# Patient Record
Sex: FEMALE | Race: WHITE | Employment: OTHER | URBAN - METROPOLITAN AREA
[De-identification: names, ages, dates, MRNs, and addresses within clinical notes are randomized per-mention and may not be internally consistent; named-entity substitution may affect disease eponyms.]

---

## 2021-09-09 ENCOUNTER — HOSPITAL ENCOUNTER (EMERGENCY)
Age: 76
Discharge: HOME | End: 2021-09-10
Attending: EMERGENCY MEDICINE
Payer: MEDICARE

## 2021-09-09 DIAGNOSIS — R04.0 ACUTE POSTERIOR EPISTAXIS: Primary | ICD-10-CM

## 2021-09-09 PROCEDURE — 99283 EMERGENCY DEPT VISIT LOW MDM: CPT

## 2021-09-10 VITALS
HEIGHT: 64 IN | DIASTOLIC BLOOD PRESSURE: 77 MMHG | HEART RATE: 133 BPM | OXYGEN SATURATION: 98 % | RESPIRATION RATE: 15 BRPM | TEMPERATURE: 98.7 F | BODY MASS INDEX: 23.07 KG/M2 | SYSTOLIC BLOOD PRESSURE: 143 MMHG | WEIGHT: 135.14 LBS

## 2021-09-10 PROCEDURE — 74011250637 HC RX REV CODE- 250/637: Performed by: EMERGENCY MEDICINE

## 2021-09-10 PROCEDURE — 30901 CONTROL OF NOSEBLEED: CPT

## 2021-09-10 RX ORDER — OXYMETAZOLINE HCL 0.05 %
2 SPRAY, NON-AEROSOL (ML) NASAL 2 TIMES DAILY
Qty: 1 EACH | Refills: 0 | Status: SHIPPED | OUTPATIENT
Start: 2021-09-10 | End: 2021-09-13

## 2021-09-10 RX ORDER — OXYMETAZOLINE HCL 0.05 %
2 SPRAY, NON-AEROSOL (ML) NASAL
Status: DISCONTINUED | OUTPATIENT
Start: 2021-09-10 | End: 2021-09-10 | Stop reason: HOSPADM

## 2021-09-10 RX ORDER — AMOXICILLIN 875 MG/1
875 TABLET, FILM COATED ORAL 2 TIMES DAILY
Qty: 10 TABLET | Refills: 0 | Status: SHIPPED | OUTPATIENT
Start: 2021-09-10 | End: 2021-09-15

## 2021-09-10 RX ADMIN — Medication 2 SPRAY: at 00:11

## 2021-09-10 NOTE — ED PROVIDER NOTES
64-year-old female who presents to the ED with family at the bedside with a complaint of nosebleed in the right nostril that began approximately 2 hours prior to arrival to the ED and has been continuous ever since. The patient state that she began to blow her nose hard and began bleeding soon thereafter. She is only taking aspirin as needed. She denies any headache, neck or back pain, chest pain, shortness of breath, nausea, vomiting, abdominal pain, dizziness, extremity weakness or numbness. No past medical history on file. No past surgical history on file. No family history on file. Social History     Socioeconomic History    Marital status: SINGLE     Spouse name: Not on file    Number of children: Not on file    Years of education: Not on file    Highest education level: Not on file   Occupational History    Not on file   Tobacco Use    Smoking status: Not on file   Substance and Sexual Activity    Alcohol use: Not on file    Drug use: Not on file    Sexual activity: Not on file   Other Topics Concern    Not on file   Social History Narrative    Not on file     Social Determinants of Health     Financial Resource Strain:     Difficulty of Paying Living Expenses:    Food Insecurity:     Worried About Running Out of Food in the Last Year:     [address] in the Last Year:    Transportation Needs:     Lack of Transportation (Medical):      Lack of Transportation (Non-Medical):    Physical Activity:     Days of Exercise per Week:     Minutes of Exercise per Session:    Stress:     Feeling of Stress :    Social Connections:     Frequency of Communication with Friends and Family:     Frequency of Social Gatherings with Friends and Family:     Attends [employer] Services:     Active Member of Clubs or Organizations:     Attends Club or Organization Meetings:     Marital Status:    Intimate Partner Violence:     Fear of Current or Ex-Partner:     Emotionally Abused:  Physically Abused:     Sexually Abused: ALLERGIES: Patient has no known allergies. Review of Systems   All other systems reviewed and are negative. Vitals:    09/10/21 0003   BP: (!) 143/77   Pulse: (!) 133   Resp: 15   Temp: 98.7 °F (37.1 °C)   SpO2: 98%   Weight: 61.3 kg (135 lb 2.3 oz)   Height: 5' 4\" (1.626 m)            Physical Exam  Vitals and nursing note reviewed. Exam conducted with a chaperone present. CONSTITUTIONAL: Well-appearing; well-nourished; in no apparent distress  HEAD: Normocephalic; atraumatic  EYES: PERRL; EOM intact; conjunctiva and sclera are clear bilaterally. ENT: Moderate bleeding to right nostril of uncertain origin; no bleeding from the left nostril; no rhinorrhea; normal pharynx with no tonsillar hypertrophy; mucous membranes pink/moist, no erythema, no exudate. NECK: Supple; non-tender; no cervical lymphadenopathy  CARD: Normal S1, S2; no murmurs, rubs, or gallops. Regular rate and rhythm. RESP: Normal respiratory effort; breath sounds clear and equal bilaterally; no wheezes, rhonchi, or rales. ABD: Normal bowel sounds; non-distended; non-tender; no palpable organomegaly, no masses, no bruits. Back Exam: Normal inspection; no vertebral point tenderness, no CVA tenderness. Normal range of motion. EXT: Normal ROM in all four extremities; non-tender to palpation; no swelling or deformity; distal pulses are normal, no edema. SKIN: Warm; dry; no rash. NEURO:Alert and oriented x 3, coherent, TERRIE-XII grossly intact, sensory and motor are non-focal.        MDM  Number of Diagnoses or Management Options  Diagnosis management comments: Assessment: Acute nosebleed of right nostril of the ulcer in origin at this time.   The patient does state that she has bleeding going through the back of her throat with clots-suspect posterior aspect of nosebleed    Plan: Education, reassurance, symptomatic treatment/epistaxis management with Afrin and Rhino Rocket/serial exam/ Monitor and Reevaluate. \       Amount and/or Complexity of Data Reviewed  Clinical lab tests: ordered and reviewed  Tests in the radiology section of CPT®: ordered and reviewed  Tests in the medicine section of CPT®: ordered  Discussion of test results with the performing providers: yes  Decide to obtain previous medical records or to obtain history from someone other than the patient: yes  Obtain history from someone other than the patient: yes  Review and summarize past medical records: yes  Discuss the patient with other providers: yes    Risk of Complications, Morbidity, and/or Mortality  Presenting problems: moderate  Diagnostic procedures: moderate  Management options: moderate    Patient Progress  Patient progress: stable         Epistaxis Management    Date/Time: 9/10/2021 12:18 AM  Performed by: Madelin Chacon MD  Authorized by: Madelin Chacon MD     Consent:     Consent obtained:  Verbal    Consent given by:  Patient    Risks discussed:  Bleeding    Alternatives discussed:  No treatment  Anesthesia (see MAR for exact dosages): Anesthesia method:  None  Procedure details:     Treatment site:  Unable to specify    Repair method: 5.5 cm Rhino Rocket. Treatment complexity:  Extensive    Treatment episode: initial    Post-procedure details:     Assessment:  Bleeding stopped    Patient tolerance of procedure: Tolerated well, no immediate complications      Progress Note:   Pt has been reexamined by Fabiana Parra MD. Pt is feeling much better. Symptoms have improved. All available results have been reviewed with pt and any available family. Pt understands sx, dx, and tx in ED. Care plan has been outlined and questions have been answered. Pt is ready to go home. Will send home on acute epistaxis instruction. Prescription of Afrin and Amoxil. Outpatient referral with PCP/ENT for reevaluation and further treatment in 3 to 5 days as needed. Written by Fabiana Parra MD,12:55 AM    .   .

## 2022-08-12 ENCOUNTER — OFFICE VISIT (OUTPATIENT)
Dept: ORTHOPEDIC SURGERY | Age: 77
End: 2022-08-12
Payer: MEDICARE

## 2022-08-12 VITALS — HEIGHT: 64 IN | WEIGHT: 130 LBS | BODY MASS INDEX: 22.2 KG/M2

## 2022-08-12 DIAGNOSIS — M47.816 LUMBAR SPONDYLOSIS: ICD-10-CM

## 2022-08-12 DIAGNOSIS — M16.11 PRIMARY OSTEOARTHRITIS OF RIGHT HIP: Primary | ICD-10-CM

## 2022-08-12 PROCEDURE — 1101F PT FALLS ASSESS-DOCD LE1/YR: CPT | Performed by: ORTHOPAEDIC SURGERY

## 2022-08-12 PROCEDURE — G8420 CALC BMI NORM PARAMETERS: HCPCS | Performed by: ORTHOPAEDIC SURGERY

## 2022-08-12 PROCEDURE — G8536 NO DOC ELDER MAL SCRN: HCPCS | Performed by: ORTHOPAEDIC SURGERY

## 2022-08-12 PROCEDURE — 1090F PRES/ABSN URINE INCON ASSESS: CPT | Performed by: ORTHOPAEDIC SURGERY

## 2022-08-12 PROCEDURE — 99203 OFFICE O/P NEW LOW 30 MIN: CPT | Performed by: ORTHOPAEDIC SURGERY

## 2022-08-12 PROCEDURE — G8427 DOCREV CUR MEDS BY ELIG CLIN: HCPCS | Performed by: ORTHOPAEDIC SURGERY

## 2022-08-12 PROCEDURE — G8400 PT W/DXA NO RESULTS DOC: HCPCS | Performed by: ORTHOPAEDIC SURGERY

## 2022-08-12 PROCEDURE — 1123F ACP DISCUSS/DSCN MKR DOCD: CPT | Performed by: ORTHOPAEDIC SURGERY

## 2022-08-12 PROCEDURE — G8432 DEP SCR NOT DOC, RNG: HCPCS | Performed by: ORTHOPAEDIC SURGERY

## 2022-08-12 RX ORDER — AMLODIPINE BESYLATE 5 MG/1
5 TABLET ORAL DAILY
COMMUNITY
Start: 2022-06-11

## 2022-08-12 RX ORDER — METOPROLOL TARTRATE 50 MG/1
50 TABLET ORAL DAILY
COMMUNITY
Start: 2022-06-11

## 2022-08-12 RX ORDER — POTASSIUM CHLORIDE 750 MG/1
10 TABLET, FILM COATED, EXTENDED RELEASE ORAL DAILY
COMMUNITY
Start: 2022-08-01

## 2022-08-12 RX ORDER — ONDANSETRON HYDROCHLORIDE 8 MG/1
1 TABLET, FILM COATED ORAL 2 TIMES DAILY
COMMUNITY
Start: 2022-07-20

## 2022-08-12 RX ORDER — PROCHLORPERAZINE MALEATE 10 MG
10 TABLET ORAL
COMMUNITY
Start: 2022-07-20

## 2022-08-12 RX ORDER — DEXAMETHASONE 4 MG/1
4 TABLET ORAL 2 TIMES DAILY
COMMUNITY
Start: 2022-07-21

## 2022-08-12 RX ORDER — TRIAMTERENE/HYDROCHLOROTHIAZID 37.5-25 MG
1 TABLET ORAL DAILY
COMMUNITY
Start: 2022-06-11

## 2022-08-12 RX ORDER — IBUPROFEN 600 MG/1
600 TABLET ORAL AS NEEDED
COMMUNITY
Start: 2022-07-19

## 2022-08-12 RX ORDER — PRAVASTATIN SODIUM 40 MG/1
40 TABLET ORAL DAILY
COMMUNITY
Start: 2022-05-27

## 2022-08-12 NOTE — PROGRESS NOTES
Farrukh Witt (: 1945) is a 64 y.o. female, patient, here for evaluation of the following chief complaint(s):  Hip Pain (Right )       SUBJECTIVE/OBJECTIVE:  Farrukh Witt presents today complaining of right buttock pain. Symptoms have been present for about 3 months. Started without trauma or event. Symptoms wax and wane. Typically present with weightbearing activities. Has some gelling symptoms and start up pain. Some days she is able to move much better than others. In general she is needed a cane since her symptoms started. While she states the symptoms are mild, her daughter is with her today says she has been much more sedentary due to the symptoms. Has no awakening night pain. No pain at rest.  No medications for her symptoms. PHYSICAL EXAM:  Vitals: Ht 5' 4\" (1.626 m)   Wt 130 lb (59 kg)   BMI 22.31 kg/m²   Body mass index is 22.31 kg/m². 64y.o. year old F, no distress. Ambulates with slow deliberate gait, but no limp or Trendelenburg pattern. No Trendelenburg sign. Has no pain flexion-extension of lumbar spine. No nerve tension signs. Negative Stinchfield right hip. No trochanteric tenderness on the right side. Right hip range of motion is limited, with gluteal pain on hip flexion and internal rotation. Symmetrical palpable distal pulses. No gross motor or sensory deficits in lower extremities. No distal edema. IMAGING:  Radiographs: XR Results (most recent):  Results from Appointment encounter on 22    XR HIP RT W OR WO PELV 2-3 VWS    Narrative  3 x-ray views of right hip including AP pelvis, AP and frog-lateral images demonstrate severe osteoarthritis of the right hip with complete loss of peripheral joint space. Hypertrophic circumferential osteophyte formation around the acetabular margin. Osteopenia. Malalignment of the pubic symphysis with loss of symphyseal space. Moderate lumbar facet arthropathy present. ASSESSMENT/PLAN:  1.  Primary osteoarthritis of right hip  -     XR HIP RT W OR WO PELV 2-3 VWS; Future  -     REFERRAL TO PHYSICAL THERAPY  2. Lumbar spondylosis  -     REFERRAL TO PHYSICAL THERAPY    The xray and exam findings were discussed with the patient today. She is probably having combined symptoms from lumbar spondylosis and right hip osteoarthritis. I cannot really determine which is the primary source. Discussed conservative treatment measures for now. We will start a course of physical therapy. If no improvement, may consider intra-articular corticosteroid injection of the right hip as a diagnostic and hopefully therapeutic measure. She will return as needed. No follow-ups on file. Review Of Systems  ROS    Positive for: Musculoskeletal  Last edited by Safia You on 8/12/2022  9:10 AM.         Patient denies any recent fever, chills, nausea, vomiting, chest pain, or shortness of breath. No Known Allergies    Current Outpatient Medications   Medication Sig    triamterene-hydroCHLOROthiazide (MAXZIDE) 37.5-25 mg per tablet Take 1 Tablet by mouth in the morning. amLODIPine (NORVASC) 5 mg tablet Take 5 mg by mouth in the morning. metoprolol tartrate (LOPRESSOR) 50 mg tablet Take 50 mg by mouth in the morning. potassium chloride SR (KLOR-CON 10) 10 mEq tablet Take 10 mEq by mouth in the morning. pravastatin (PRAVACHOL) 40 mg tablet Take 40 mg by mouth in the morning. ibuprofen (MOTRIN) 600 mg tablet Take 600 mg by mouth as needed. dexAMETHasone (DECADRON) 4 mg tablet Take 4 mg by mouth two (2) times a day. ondansetron hcl (ZOFRAN) 8 mg tablet Take 1 Tablet by mouth two (2) times a day. prochlorperazine (COMPAZINE) 10 mg tablet Take 10 mg by mouth every six (6) hours as needed. No current facility-administered medications for this visit.        Past Medical History:   Diagnosis Date    Hypertension         Past Surgical History:   Procedure Laterality Date    HX TUMOR REMOVAL  06/03/2022    from abdomen       History reviewed. No pertinent family history. Social History     Socioeconomic History    Marital status: SINGLE     Spouse name: Not on file    Number of children: Not on file    Years of education: Not on file    Highest education level: Not on file   Occupational History    Not on file   Tobacco Use    Smoking status: Never    Smokeless tobacco: Never   Vaping Use    Vaping Use: Never used   Substance and Sexual Activity    Alcohol use: Not Currently    Drug use: Never    Sexual activity: Not Currently   Other Topics Concern    Not on file   Social History Narrative    Not on file     Social Determinants of Health     Financial Resource Strain: Not on file   Food Insecurity: Not on file   Transportation Needs: Not on file   Physical Activity: Not on file   Stress: Not on file   Social Connections: Not on file   Intimate Partner Violence: Not on file   Housing Stability: Not on file       Orders Placed This Encounter    XR HIP RT W OR WO PELV 2-3 VWS     Standing Status:   Future     Number of Occurrences:   1     Standing Expiration Date:   8/13/2023    REFERRAL TO PHYSICAL THERAPY     Referral Priority:   Routine     Referral Type:   PT/OT/ST     Referral Reason:   Specialty Services Required     Number of Visits Requested:   1        An electronic signature was used to authenticate this note.   -- Lobo Finley MD

## 2023-04-28 ENCOUNTER — OFFICE VISIT (OUTPATIENT)
Dept: ORTHOPEDIC SURGERY | Age: 78
End: 2023-04-28

## 2023-04-28 VITALS — HEIGHT: 64 IN | WEIGHT: 130 LBS | BODY MASS INDEX: 22.2 KG/M2

## 2023-04-28 DIAGNOSIS — M84.364A STRESS FRACTURE OF LEFT FIBULA, INITIAL ENCOUNTER: Primary | ICD-10-CM

## 2023-04-28 DIAGNOSIS — M25.572 ACUTE LEFT ANKLE PAIN: ICD-10-CM

## 2023-04-28 NOTE — PROGRESS NOTES
Rcihy Wilkins (: 1945) is a 76 y.o. female, patient,here for evaluation of the following   Chief Complaint   Patient presents with    Ankle Pain     left        ASSESSMENT/PLAN:  Below is the assessment and plan developed based on review of pertinent history, physical exam, labs, studies, and medications. 1. Stress fracture of left fibula, initial encounter  2. Acute left ankle pain  -     XR STANDING ANKLE LT MIN 3 V; Future  -     REFERRAL TO DME  -     [place] [facility] ANKLE GAUNT[place] PRE OTS    Patient verbalized understanding of exam findings and treatment plan. We engaged in the shared decision-making process and treatment options were discussed at length with the patient. Surgical and conservative management discussed today along with risk and benefits. Patient informed of findings on exam and x-rays reviewed. Based on exam and the x-rays correlate, she appears she has a stress fracture through the fibula, she is informed of this finding, and discussed the treatment. We discussed both conservative and operative, surgery is not indicated so we will either go with a boot or a brace. For this patient, he should do much better with a brace and so I recommended a brace today. She is offered and fitted with an ankle stabilizing orthosis. She is weightbearing as tolerated. Next time she returns we will get new x-rays of the left ankle 3 views nonweightbearing, to compare to the x-rays today to see the fracture line is more visible    I discussed management options with the patient. All questions were answered to the best of my ability and to the patient's satisfaction. I discussed their history, symptoms, physical exam findings, diagnostic testing results, diagnosis and treatment options. The patient verbalized understanding and elected to proceed with conservative treatment as above. Return in about 4 weeks (around 2023) for repeat xrays.       No Known Allergies    Current Outpatient Medications   Medication Sig    triamterene-hydroCHLOROthiazide (MAXZIDE) 37.5-25 mg per tablet Take 1 Tablet by mouth in the morning. amLODIPine (NORVASC) 5 mg tablet Take 5 mg by mouth in the morning. metoprolol tartrate (LOPRESSOR) 50 mg tablet Take 50 mg by mouth in the morning. potassium chloride SR (KLOR-CON 10) 10 mEq tablet Take 10 mEq by mouth in the morning. pravastatin (PRAVACHOL) 40 mg tablet Take 40 mg by mouth in the morning. ibuprofen (MOTRIN) 600 mg tablet Take 600 mg by mouth as needed. dexAMETHasone (DECADRON) 4 mg tablet Take 4 mg by mouth two (2) times a day. ondansetron hcl (ZOFRAN) 8 mg tablet Take 1 Tablet by mouth two (2) times a day. prochlorperazine (COMPAZINE) 10 mg tablet Take 10 mg by mouth every six (6) hours as needed. No current facility-administered medications for this visit. Past Medical History:   Diagnosis Date    Hypertension        Past Surgical History:   Procedure Laterality Date    HX TUMOR REMOVAL  06/03/2022    from abdomen       No family history on file.     Social History     Socioeconomic History    Marital status: SINGLE     Spouse name: Not on file    Number of children: Not on file    Years of education: Not on file    Highest education level: Not on file   Occupational History    Not on file   Tobacco Use    Smoking status: Never    Smokeless tobacco: Never   Vaping Use    Vaping Use: Never used   Substance and Sexual Activity    Alcohol use: Not Currently    Drug use: Never    Sexual activity: Not Currently   Other Topics Concern    Not on file   Social History Narrative    Not on file     Social Determinants of Health     Financial Resource Strain: Not on file   Food Insecurity: Not on file   Transportation Needs: Not on file   Physical Activity: Not on file   Stress: Not on file   Social Connections: Not on file   Intimate Partner Violence: Not on file   Housing Stability: Not on file           Vitals:  Ht 5' 4\" (1.626 m)   Saige 130 lb (59 kg)   BMI 22.31 kg/m²    Body mass index is 22.31 kg/m². SUBJECTIVE:  Pamela Camacho (: 1945)   The patient presents today with complaint of pain along the lateral aspect of ankle since 2 weeks ago starting around 2023. Current pain is moderate throbbing pain that comes and goes worse with walking or standing. Symptoms are better with rest but not fully improved. She has not seen other physicians for this problem. Not diabetic. Has heart disease and hypertension. Non-smoker. OBJECTIVE EXAM:     Visit Vitals  Ht 5' 4\" (1.626 m)   Wt 130 lb (59 kg)   BMI 22.31 kg/m²       Appearance: Alert, well appearing and pleasant patient who is in no distress, oriented to person, place/time, and who follows commands. This patient is accompanied in the       office by her  daughter. Psychiatric: Affect and mood are appropriate. No dementia noted on examination  Musculoskeletal:  LOCATION: tender and mild swelling fibula ankle - left      Integumentary: No rashes, skin patches, wounds, or abrasions to the right or left legs       Warm and normal color. No regions of expressible drainage. Gait: Normal      Tenderness: No tenderness        Motor/Strength/Tone Exam: Normal       Sensory Exam:   Intact Normal Sensation to ankle/foot      Stability Testing: No anterolateral or varus instability of the Ankle or Subtalar Joints               No peroneal tendon instability noted      ROM: Normal ROM noted to ankle/foot      Contractures: No Achilles or Gastrocnemius Contractures      Calf tenderness: Absent for calf or gastrocnemius muscle regions       Soft, supple, non tender, non taut lower extremity compartment  Alignment:      NEUTRAL Hindfoot,         none Metatarsus Adductus Metatarsus   Wounds/Abrasions:    None present  Extremities:   No embolic phenomena to the toes          No significant edema to the foot and or toes.         Lower extremities are warm and appear well perfused    DVT: No evidence of DVT seen on examination at this time     No calf swelling, no tenderness to calf muscles  Lymphatic:  No Evidence of Lymphedema  Vascular: Medial Border of Tibia Region: Edema is not present         Pulses: Dorsalis Pedis &  Posterior Tibial Pulses : Palpable yes        Varicosities Lower Limbs :  None  noted  Neuro: Negative bilateral Straight leg raise (seated position)    See Musculoskeletal section for pertinent individual extremity examination    No abnormal hand/wrist, foot/ankle, or facial/neck tremors. Lower Extremity/Ankle/Foot:  Mild antalgic gait, satisfactory weightbearing stance. Left lower extremity/ankle: Tender along the lateral volumes with the palpation, peroneal tendons nontender. Reevaluation taken nontender. Range of motion dorsiflexion palpation intact similar to contralateral.  Strength 5/5 in all ranges. Calf soft nontender. Left foot: There is no tenderness to palpation, ligaments are grossly stable. 2+ dorsalis pedis pulse. Toes are warm and well-perfused. Sensation is intact to light touch in the DP, SP, sural, saphenous, and tibial nerve distributions. Strength 5/5 in all directions of motion    Right Lower extremity: Skin intact without erythema or wounds. 2+ dorsalis pedis pulse. Toes are warm, and well-perfused. Sensation is intact to light touch in the DP, SP, sural, saphenous, and tibial nerve distributions. 5/5 strength in ankle dorsiflexion, plantarflexion, inversion, and eversion. Ankle range of motion is 5 degrees of dorsiflexion to 40 degrees of plantarflexion. Smooth and painless hindfoot and midfoot range of motion. No severe hallux, lesser toe malalignment or deformities, no pain with passive motion of lesser MTP joints, hallux MTP joint, no first TMT instability.       Imaging:    XR Results (most recent):  Results from Appointment encounter on 04/28/23    XR STANDING ANKLE LT MIN 3 V    Narrative  Left ankle standing AP, lateral oblique x-rays show the fibula distal shaft shows the possibility of stress fracture Colles' with clinical exam cortex is slightly different disrupted. Otherwise well aligned, minimal soft tissue swelling. There is osteopenic bone. An electronic signature was used to authenticate this note.   -- Ben Penn MD